# Patient Record
Sex: MALE | Race: WHITE | Employment: FULL TIME | URBAN - METROPOLITAN AREA
[De-identification: names, ages, dates, MRNs, and addresses within clinical notes are randomized per-mention and may not be internally consistent; named-entity substitution may affect disease eponyms.]

---

## 2017-09-25 ENCOUNTER — ANESTHESIA EVENT (OUTPATIENT)
Dept: SURGERY | Age: 52
End: 2017-09-25
Payer: COMMERCIAL

## 2017-09-25 RX ORDER — CHOLECALCIFEROL TAB 125 MCG (5000 UNIT) 125 MCG
5000 TAB ORAL DAILY
COMMUNITY

## 2017-09-26 ENCOUNTER — ANESTHESIA (OUTPATIENT)
Dept: SURGERY | Age: 52
End: 2017-09-26
Payer: COMMERCIAL

## 2017-09-26 ENCOUNTER — HOSPITAL ENCOUNTER (OUTPATIENT)
Age: 52
Setting detail: OUTPATIENT SURGERY
Discharge: HOME | End: 2017-09-26
Attending: SURGERY | Admitting: SURGERY
Payer: COMMERCIAL

## 2017-09-26 VITALS
HEART RATE: 70 BPM | TEMPERATURE: 97.8 F | RESPIRATION RATE: 17 BRPM | OXYGEN SATURATION: 99 % | WEIGHT: 210.1 LBS | BODY MASS INDEX: 28.46 KG/M2 | HEIGHT: 72 IN | SYSTOLIC BLOOD PRESSURE: 141 MMHG | DIASTOLIC BLOOD PRESSURE: 84 MMHG

## 2017-09-26 PROCEDURE — 74011000250 HC RX REV CODE- 250: Performed by: SURGERY

## 2017-09-26 PROCEDURE — 74011250637 HC RX REV CODE- 250/637: Performed by: SURGERY

## 2017-09-26 PROCEDURE — 77030031139 HC SUT VCRL2 J&J -A: Performed by: SURGERY

## 2017-09-26 PROCEDURE — 74011250636 HC RX REV CODE- 250/636: Performed by: SURGERY

## 2017-09-26 PROCEDURE — 74011250636 HC RX REV CODE- 250/636

## 2017-09-26 PROCEDURE — 76060000032 HC ANESTHESIA 0.5 TO 1 HR: Performed by: SURGERY

## 2017-09-26 PROCEDURE — 77030032490 HC SLV COMPR SCD KNE COVD -B: Performed by: SURGERY

## 2017-09-26 PROCEDURE — 76010000138 HC OR TIME 0.5 TO 1 HR: Performed by: SURGERY

## 2017-09-26 PROCEDURE — 88304 TISSUE EXAM BY PATHOLOGIST: CPT | Performed by: SURGERY

## 2017-09-26 PROCEDURE — 76210000021 HC REC RM PH II 0.5 TO 1 HR: Performed by: SURGERY

## 2017-09-26 PROCEDURE — 77030011640 HC PAD GRND REM COVD -A: Performed by: SURGERY

## 2017-09-26 PROCEDURE — 74011250636 HC RX REV CODE- 250/636: Performed by: ANESTHESIOLOGY

## 2017-09-26 PROCEDURE — 77030002933 HC SUT MCRYL J&J -A: Performed by: SURGERY

## 2017-09-26 PROCEDURE — 77030018836 HC SOL IRR NACL ICUM -A: Performed by: SURGERY

## 2017-09-26 PROCEDURE — 77030020782 HC GWN BAIR PAWS FLX 3M -B

## 2017-09-26 PROCEDURE — 77030010507 HC ADH SKN DERMBND J&J -B: Performed by: SURGERY

## 2017-09-26 RX ORDER — LIDOCAINE HYDROCHLORIDE 10 MG/ML
0.1 INJECTION, SOLUTION EPIDURAL; INFILTRATION; INTRACAUDAL; PERINEURAL AS NEEDED
Status: DISCONTINUED | OUTPATIENT
Start: 2017-09-26 | End: 2017-09-26 | Stop reason: HOSPADM

## 2017-09-26 RX ORDER — DIPHENHYDRAMINE HYDROCHLORIDE 50 MG/ML
12.5 INJECTION, SOLUTION INTRAMUSCULAR; INTRAVENOUS AS NEEDED
Status: DISCONTINUED | OUTPATIENT
Start: 2017-09-26 | End: 2017-09-26 | Stop reason: HOSPADM

## 2017-09-26 RX ORDER — CEFAZOLIN SODIUM IN 0.9 % NACL 2 G/50 ML
2 INTRAVENOUS SOLUTION, PIGGYBACK (ML) INTRAVENOUS ONCE
Status: COMPLETED | OUTPATIENT
Start: 2017-09-26 | End: 2017-09-26

## 2017-09-26 RX ORDER — MIDAZOLAM HYDROCHLORIDE 1 MG/ML
2 INJECTION, SOLUTION INTRAMUSCULAR; INTRAVENOUS
Status: DISCONTINUED | OUTPATIENT
Start: 2017-09-26 | End: 2017-09-26 | Stop reason: HOSPADM

## 2017-09-26 RX ORDER — SODIUM CHLORIDE, SODIUM LACTATE, POTASSIUM CHLORIDE, CALCIUM CHLORIDE 600; 310; 30; 20 MG/100ML; MG/100ML; MG/100ML; MG/100ML
125 INJECTION, SOLUTION INTRAVENOUS CONTINUOUS
Status: DISCONTINUED | OUTPATIENT
Start: 2017-09-26 | End: 2017-09-26 | Stop reason: HOSPADM

## 2017-09-26 RX ORDER — PROPOFOL 10 MG/ML
INJECTION, EMULSION INTRAVENOUS AS NEEDED
Status: DISCONTINUED | OUTPATIENT
Start: 2017-09-26 | End: 2017-09-26 | Stop reason: HOSPADM

## 2017-09-26 RX ORDER — HYDROCODONE BITARTRATE AND ACETAMINOPHEN 5; 325 MG/1; MG/1
1 TABLET ORAL
Qty: 15 TAB | Refills: 0 | Status: SHIPPED | OUTPATIENT
Start: 2017-09-26

## 2017-09-26 RX ORDER — FLUMAZENIL 0.1 MG/ML
0.2 INJECTION INTRAVENOUS
Status: DISCONTINUED | OUTPATIENT
Start: 2017-09-26 | End: 2017-09-26 | Stop reason: HOSPADM

## 2017-09-26 RX ORDER — NALOXONE HYDROCHLORIDE 0.4 MG/ML
0.2 INJECTION, SOLUTION INTRAMUSCULAR; INTRAVENOUS; SUBCUTANEOUS
Status: DISCONTINUED | OUTPATIENT
Start: 2017-09-26 | End: 2017-09-26 | Stop reason: HOSPADM

## 2017-09-26 RX ORDER — MIDAZOLAM HYDROCHLORIDE 1 MG/ML
INJECTION, SOLUTION INTRAMUSCULAR; INTRAVENOUS AS NEEDED
Status: DISCONTINUED | OUTPATIENT
Start: 2017-09-26 | End: 2017-09-26 | Stop reason: HOSPADM

## 2017-09-26 RX ORDER — HYDROMORPHONE HYDROCHLORIDE 1 MG/ML
.25-1 INJECTION, SOLUTION INTRAMUSCULAR; INTRAVENOUS; SUBCUTANEOUS
Status: DISCONTINUED | OUTPATIENT
Start: 2017-09-26 | End: 2017-09-26 | Stop reason: HOSPADM

## 2017-09-26 RX ORDER — PROPOFOL 10 MG/ML
INJECTION, EMULSION INTRAVENOUS
Status: DISCONTINUED | OUTPATIENT
Start: 2017-09-26 | End: 2017-09-26 | Stop reason: HOSPADM

## 2017-09-26 RX ORDER — HYDROCODONE BITARTRATE AND ACETAMINOPHEN 5; 325 MG/1; MG/1
2 TABLET ORAL
Status: COMPLETED | OUTPATIENT
Start: 2017-09-26 | End: 2017-09-26

## 2017-09-26 RX ADMIN — CEFAZOLIN 2 G: 1 INJECTION, POWDER, FOR SOLUTION INTRAMUSCULAR; INTRAVENOUS; PARENTERAL at 07:24

## 2017-09-26 RX ADMIN — HYDROCODONE BITARTRATE AND ACETAMINOPHEN 2 TABLET: 5; 325 TABLET ORAL at 08:51

## 2017-09-26 RX ADMIN — MIDAZOLAM HYDROCHLORIDE 2 MG: 1 INJECTION, SOLUTION INTRAMUSCULAR; INTRAVENOUS at 07:29

## 2017-09-26 RX ADMIN — SODIUM CHLORIDE, SODIUM LACTATE, POTASSIUM CHLORIDE, AND CALCIUM CHLORIDE 125 ML/HR: 600; 310; 30; 20 INJECTION, SOLUTION INTRAVENOUS at 06:29

## 2017-09-26 RX ADMIN — PROPOFOL 20 MG: 10 INJECTION, EMULSION INTRAVENOUS at 07:41

## 2017-09-26 RX ADMIN — PROPOFOL 20 MG: 10 INJECTION, EMULSION INTRAVENOUS at 07:38

## 2017-09-26 RX ADMIN — PROPOFOL 100 MCG/KG/MIN: 10 INJECTION, EMULSION INTRAVENOUS at 07:32

## 2017-09-26 RX ADMIN — MIDAZOLAM HYDROCHLORIDE 3 MG: 1 INJECTION, SOLUTION INTRAMUSCULAR; INTRAVENOUS at 07:24

## 2017-09-26 NOTE — IP AVS SNAPSHOT
[place] [address] 
[phone] Patient: Dc Paniagua MRN: [id number] :1965 Current Discharge Medication List  
  
START taking these medications Dose & Instructions Dispensing Information Comments Morning Noon Evening Bedtime HYDROcodone-acetaminophen 5-325 mg per tablet Commonly known as:  Valente Ziegler Your last dose was: Your next dose is:    
   
   
 Dose:  1 Tab Take 1 Tab by mouth every four (4) hours as needed for Pain. Max Daily Amount: 6 Tabs. Quantity:  15 Tab Refills:  0 ASK your doctor about these medications Dose & Instructions Dispensing Information Comments Morning Noon Evening Bedtime  
 cholecalciferol (VITAMIN D3) 5,000 unit Tab tablet Commonly known as:  VITAMIN D3 Your last dose was: Your next dose is:    
   
   
 Dose:  5000 Units Take 5,000 Units by mouth daily. Refills:  0 Where to Get Your Medications Information on where to get these meds will be given to you by the nurse or doctor. ! Ask your nurse or doctor about these medications HYDROcodone-acetaminophen 5-325 mg per tablet

## 2017-09-26 NOTE — OP NOTES
Patient: Carson Alcazar MRN: [id number]  SSN: [id number]    YOB: 1965  Age: 47 y.o. Sex: male        OPERATIVE REPORT -     PREOPERATIVE DIAGNOSIS: CYST RIGHT SHOULDER    POSTOPERATIVE DIAGNOSIS: CYST RIGHT SHOULDER    OPERATIVE PROCEDURE:  Excision of cyst right shoulder    SURGEON: Fidencio Hamm MD    ANESTHESIA: mac    ANESTHESIOLOGIST: Dr Chelsey Collins: None    ESTIMATED BLOOD LOSS: Minimal.    INDICATION: Documented in the history and physical.    FINDINGS: there was an inflamed cyst in the right shoulder     PROCEDURE:     Patient was evaluated in the preop holding area and updates to history and physical was completed. The site of surgery was confirmed with the patient and the site was marked with indelible ink. Patient was then brought to the operating room and under intravenous sedation and with the patient in semi lateral position, with the pressure points protected, the appropriate area was cleaned and prepped in the standard manner. A time out was completed confirming patient, , procedure, site of surgery, special instruments and equipment needed for the procedure. Local anesthesia was infiltrated using a combination of lidocaine, marcaine . Once analgesia had been confirmed, an elliptical 5.4 cm  incision was made from the lateral margins of the are of residual inflammation. Dissection was continued carefully using sharp knife and electro cautery dissection. The mass was grasped with an Allis clamp, and using gentle traction and careful dissection, the mass was excised down to and including part of the deep fascia. The incision was then closed with 2/0 Vicryl to the deep fascia, applied in an interrupted fashion. The superficial fascia was approximated with 3/0 Monocryl and the skin was closed with subcuticular 4/0 Monocryl with dermabond. A pressure dressing was applied.        SPECIMEN: excised cyst    COUNTS: Correct at the completion of surgery.     Edwige Cr MD

## 2017-09-26 NOTE — ANESTHESIA POSTPROCEDURE EVALUATION
Post-Anesthesia Evaluation and Assessment    Patient: Marcos Dozier MRN: [id number]  SSN: [id number]    YOB: 1965  Age: 47 y.o. Sex: male       Cardiovascular Function/Vital Signs  Visit Vitals    /84 (BP 1 Location: Left arm, BP Patient Position: At rest)    Pulse 70    Temp 36.6 °C (97.8 °F)    Resp 17    Ht 6' (1.829 m)    Wt 95.3 kg (210 lb 1.6 oz)    SpO2 99%    BMI 28.49 kg/m2       Patient is status post MAC anesthesia for Procedure(s):  EXCISION CYST RIGHT SHOULDER . Nausea/Vomiting: None    Postoperative hydration reviewed and adequate. Pain:  Pain Scale 1: Numeric (0 - 10) (09/26/17 0845)  Pain Intensity 1: 3 (09/26/17 0845)   Managed    Neurological Status:   Neuro (WDL): Within Defined Limits (09/26/17 0845)  Neuro  LUE Motor Response: Purposeful (09/26/17 0845)  LLE Motor Response: Purposeful (09/26/17 0845)  RUE Motor Response: Purposeful (09/26/17 0845)  RLE Motor Response: Purposeful (09/26/17 0845)   At baseline    Mental Status and Level of Consciousness: Arousable    Pulmonary Status:   O2 Device: Room air (09/26/17 0845)   Adequate oxygenation and airway patent    Complications related to anesthesia: None    Post-anesthesia assessment completed.  No concerns    Signed By: Kelsea Pruitt MD     September 26, 2017

## 2017-09-26 NOTE — ANESTHESIA PREPROCEDURE EVALUATION
Anesthetic History   No history of anesthetic complications            Review of Systems / Medical History  Patient summary reviewed    Pulmonary  Within defined limits                 Neuro/Psych   Within defined limits           Cardiovascular  Within defined limits                Exercise tolerance: >4 METS     GI/Hepatic/Renal  Within defined limits              Endo/Other  Within defined limits           Other Findings              Physical Exam    Airway  Mallampati: II  TM Distance: 4 - 6 cm  Neck ROM: normal range of motion   Mouth opening: Normal     Cardiovascular    Rhythm: regular  Rate: normal         Dental  No notable dental hx       Pulmonary  Breath sounds clear to auscultation               Abdominal         Other Findings            Anesthetic Plan    ASA: 1  Anesthesia type: MAC            Anesthetic plan and risks discussed with: Patient

## 2017-09-26 NOTE — BRIEF OP NOTE
BRIEF OPERATIVE NOTE    Date of Procedure: 9/26/2017   Preoperative Diagnosis: CYST RIGHT SHOULDER  Postoperative Diagnosis: CYST RIGHT SHOULDER    Procedure(s):  EXCISION CYST RIGHT SHOULDER   Surgeon(s) and Role:      * Bacilio Porras MD - Primary         Assistant Staff:       Surgical Staff:  Circ-1: Jordyn Parikh RN  Scrub Tech-1: Ortiz Gilliland  Surg Asst-1: Lera Cabot, RN  Event Time In   Incision Start 1138   Incision Close      Anesthesia: MAC   Estimated Blood Loss: minimal  Specimens:   ID Type Source Tests Collected by Time Destination   1 : cyst right shoulder Preservative Shoulder  Bacilio Porras MD 9/26/2017 3553 Pathology      Findings: recently inflamed cyst   Complications: none  Implants: * No implants in log *

## 2017-09-26 NOTE — IP AVS SNAPSHOT
[address] 
[phone] Patient: Sasha Breen MRN: [id number] :1965 You are allergic to the following No active allergies Recent Documentation Height Weight BMI Smoking Status 1.829 m 95.3 kg 28.49 kg/m2 Never Smoker Emergency Contacts Name Discharge Info Relation Home Work Mobile Rima Pablo DISCHARGE CAREGIVER [3] Spouse [3]   [phone] About your hospitalization You were admitted on:  2017 You last received care in the:  [facility] PACU You were discharged on:  2017 Unit phone number:  [phone] Why you were hospitalized Your primary diagnosis was:  Not on File Providers Seen During Your Hospitalizations Provider Role Specialty Primary office phone Sindy Morel MD Attending Provider General Surgery [phone] Your Primary Care Physician (PCP) Primary Care Physician Office Phone Office Fax Axel Kessler [phone] Follow-up Information Follow up With Details Comments Contact Info Ruddy Ma DO   [address] [facility] 
[phone] Current Discharge Medication List  
  
START taking these medications Dose & Instructions Dispensing Information Comments Morning Noon Evening Bedtime HYDROcodone-acetaminophen 5-325 mg per tablet Commonly known as:  Denis Romano Your last dose was: Your next dose is:    
   
   
 Dose:  1 Tab Take 1 Tab by mouth every four (4) hours as needed for Pain. Max Daily Amount: 6 Tabs. Quantity:  15 Tab Refills:  0 ASK your doctor about these medications Dose & Instructions Dispensing Information Comments Morning Noon Evening Bedtime  
 cholecalciferol (VITAMIN D3) 5,000 unit Tab tablet Commonly known as:  VITAMIN D3 Your last dose was: Your next dose is:    
   
   
 Dose:  5000 Units Take 5,000 Units by mouth daily. Refills:  0 Where to Get Your Medications Information on where to get these meds will be given to you by the nurse or doctor. ! Ask your nurse or doctor about these medications HYDROcodone-acetaminophen 5-325 mg per tablet Discharge Instructions Patient Discharge Instructions Marycarmen Cancino / [id number] : 1965 Admitted 2017 Discharged: 2017 Take Home Medications · It is important that you take the medication exactly as they are prescribed. · Keep your medication in the bottles provided by the pharmacist and keep a list of the medication names, dosages, and times to be taken in your wallet. · Do not take other medications without consulting your doctor. What to do at [facility] Recommended diet: Regular Diet Recommended activity: Activity as tolerated If you experience any of the following symptoms severe pain or bleeding, please follow up with Bere Mcduffie MD 
. Follow-up with Bere Mcduffie MD 
 in 1 week Information obtained by : 
I understand that if any problems occur once I am at home I am to contact my physician. I understand and acknowledge receipt of the instructions indicated above. Physician's or R.N.'s Signature                                                                  Date/Time Patient or Representative Signature                                                          Date/Time DISCHARGE SUMMARY from your Nurse The following personal items collected during your admission are returned to you:  
Dental Appliance: Dental Appliances: None Vision: Visual Aid: Glasses Hearing Aid:   
Jewelry: Jewelry: None Clothing: Clothing: Other (comment) (clothes to locker) Other Valuables: Other Valuables: None Valuables sent to safe: Personal Items Sent to Safe: cell phone PATIENT INSTRUCTIONS: 
 
After general anesthesia or intravenous sedation, for 24 hours or while taking prescription Narcotics: · Limit your activities · Do not drive and operate hazardous machinery · Do not make important personal or business decisions · Do  not drink alcoholic beverages · If you have not urinated within 8 hours after discharge, please contact your surgeon on call. Report the following to your surgeon: 
· Excessive pain, swelling, redness or odor of or around the surgical area · Temperature over 100.5 · Nausea and vomiting lasting longer than 4 hours or if unable to take medications · Any signs of decreased circulation or nerve impairment to extremity: change in color, persistent  numbness, tingling, coldness or increase pain · Any questions [address] Breathing deep and coughing are very important exercises to do after surgery. Deep breathing and coughing open the little air tubes and air sacks in your lungs. You take deep breaths every day. You may not even notice - it is just something you do when you sigh or yawn. It is a natural exercise you do to keep these air passages open. After surgery, take deep breaths and cough, on purpose. Coughing and deep breathing help prevent bronchitis and pneumonia after surgery. If you had chest or belly surgery, use a pillow as a \"hug buddy\" and hold it tightly to your chest or belly when you cough. DIRECTIONS: 
6. Take 10 to 15 slow deep breaths every hour while awake. 7. Breathe in deeply, and hold it for 2 seconds. 8. Exhale slowly through puckered lips, like blowing up a balloon. 9. After every 4th or 5th deep breath, hug your pillow to your chest or belly and give a hard, deep cough. Yes, it will probably hurt. But doing this exercise is very important part of healing after surgery. Take your pain medicine to help you do this exercise without too much pain. IF YOU HAVE BEEN DIAGNOSED WITH SLEEP APNEA, PLEASE USE YOUR SLEEP APNEA DEVICE OR CPAP MACHINE WHEN YOU INTEND TO NAP AFTER TAKING PAIN MEDICATION. Ankle Pumps Ankle pumps increase the circulation of oxygenated blood to your lower extremities and decrease your risk for circulation problems such as blood clots. They also stretch the muscles, tendons and ligaments in your foot and ankle, and prevent joint contracture in the ankle and foot, especially after surgeries on the legs. It is important to do ankle pump exercises regularly after surgery because immobility increases your risk for developing a blood clot. Your doctor may also have you take an Aspirin for the next few days as well. If your doctor did not ask you to take an Aspirin, consult with him before starting Aspirin therapy on your own. Slowly point your foot forward, feeling the muscles on the top of your lower leg stretch, and hold this position for 5 seconds. Next, pull your foot back toward you as far as possible, stretching the calf muscles, and hold that position for 5 seconds. Repeat with the other foot. Perform 10 repetitions every hour while awake for both ankles if possible (down and then up with the foot once is one repetition). You should feel gentle stretching of the muscles in your lower leg when doing this exercise. If you feel pain, or your range of motion is limited, don't  Push too hard. Only go the limit your joint and muscles will let you go.   If you have increasing pain, progressively worsening leg warmth or swelling, STOP the exercise and call your doctor. Below is information about the medications your doctor is prescribing after your visit: 
 
 
 
 
Discharge Orders None Introducing [facility]! Tavia Christiansen introduces [employer] patient portal. Now you can access parts of your medical record, email your doctor's office, and request medication refills online. 1. In your internet browser, go to [email or web address] 2. Click on the First Time User? Click Here link in the Sign In box. You will see the New Member Sign Up page. 3. Enter your [employer] Access Code exactly as it appears below. You will not need to use this code after youve completed the sign-up process. If you do not sign up before the expiration date, you must request a new code. · [employer] Access Code: [email or web address] Expires: 12/25/2017  5:29 AM 
 
4. Enter the last four digits of your Social Security Number (xxxx) and Date of Birth (mm/dd/yyyy) as indicated and click Submit. You will be taken to the next sign-up page. 5. Create a [employer] ID. This will be your [employer] login ID and cannot be changed, so think of one that is secure and easy to remember. 6. Create a [employer] password. You can change your password at any time. 7. Enter your Password Reset Question and Answer. This can be used at a later time if you forget your password. 8. Enter your e-mail address. You will receive e-mail notification when new information is available in [address] 9. Click Sign Up. You can now view and download portions of your medical record. 10. Click the Download Summary menu link to download a portable copy of your medical information. If you have questions, please visit the Frequently Asked Questions section of the [employer] website. Remember, [employer] is NOT to be used for urgent needs. For medical emergencies, dial 911. Now available from your iPhone and Android! General Information Please provide this summary of care documentation to your next provider. Patient Signature:  ____________________________________________________________ Date:  ____________________________________________________________  
  
Millard Livings Provider Signature:  ____________________________________________________________ Date:  ____________________________________________________________

## 2017-09-26 NOTE — DISCHARGE INSTRUCTIONS
Patient Discharge Instructions    Ruben Patterson / [id number] : 1965    Admitted 2017 Discharged: 2017     Take Home Medications            · It is important that you take the medication exactly as they are prescribed. · Keep your medication in the bottles provided by the pharmacist and keep a list of the medication names, dosages, and times to be taken in your wallet. · Do not take other medications without consulting your doctor. What to do at Home    Recommended diet: Regular Diet    Recommended activity: Activity as tolerated    If you experience any of the following symptoms severe pain or bleeding, please follow up with Keara Cardoso MD  .    Follow-up with Keara Cardoso MD   in 1 week        Information obtained by :  I understand that if any problems occur once I am at home I am to contact my physician. I understand and acknowledge receipt of the instructions indicated above. Physician's or R.N.'s Signature                                                                  Date/Time                                                                                                                                              Patient or Representative Signature                                                          Date/Time    DISCHARGE SUMMARY from your Nurse    The following personal items collected during your admission are returned to you:   Dental Appliance: Dental Appliances: None  Vision: Visual Aid: Glasses  Hearing Aid:    Jewelry: Jewelry: None  Clothing: Clothing: Other (comment) (clothes to locker)  Other Valuables:  Other Valuables: None  Valuables sent to safe: Personal Items Sent to Safe: cell phone    PATIENT INSTRUCTIONS:    After general anesthesia or intravenous sedation, for 24 hours or while taking prescription Narcotics:  · Limit your activities  · Do not drive and operate hazardous machinery  · Do not make important personal or business decisions  · Do  not drink alcoholic beverages  · If you have not urinated within 8 hours after discharge, please contact your surgeon on call. Report the following to your surgeon:  · Excessive pain, swelling, redness or odor of or around the surgical area  · Temperature over 100.5  · Nausea and vomiting lasting longer than 4 hours or if unable to take medications  · Any signs of decreased circulation or nerve impairment to extremity: change in color, persistent  numbness, tingling, coldness or increase pain  · Any questions    COUGH AND DEEP BREATHE    Breathing deep and coughing are very important exercises to do after surgery. Deep breathing and coughing open the little air tubes and air sacks in your lungs. You take deep breaths every day. You may not even notice - it is just something you do when you sigh or yawn. It is a natural exercise you do to keep these air passages open. After surgery, take deep breaths and cough, on purpose. Coughing and deep breathing help prevent bronchitis and pneumonia after surgery. If you had chest or belly surgery, use a pillow as a \"hug buddy\" and hold it tightly to your chest or belly when you cough. DIRECTIONS:  6. Take 10 to 15 slow deep breaths every hour while awake. 7. Breathe in deeply, and hold it for 2 seconds. 8. Exhale slowly through puckered lips, like blowing up a balloon. 9. After every 4th or 5th deep breath, hug your pillow to your chest or belly and give a hard, deep cough. Yes, it will probably hurt. But doing this exercise is very important part of healing after surgery. Take your pain medicine to help you do this exercise without too much pain.     IF YOU HAVE BEEN DIAGNOSED WITH SLEEP APNEA, PLEASE USE YOUR SLEEP APNEA DEVICE OR CPAP MACHINE WHEN YOU INTEND TO NAP AFTER TAKING PAIN MEDICATION. Ankle Pumps    Ankle pumps increase the circulation of oxygenated blood to your lower extremities and decrease your risk for circulation problems such as blood clots. They also stretch the muscles, tendons and ligaments in your foot and ankle, and prevent joint contracture in the ankle and foot, especially after surgeries on the legs. It is important to do ankle pump exercises regularly after surgery because immobility increases your risk for developing a blood clot. Your doctor may also have you take an Aspirin for the next few days as well. If your doctor did not ask you to take an Aspirin, consult with him before starting Aspirin therapy on your own. Slowly point your foot forward, feeling the muscles on the top of your lower leg stretch, and hold this position for 5 seconds. Next, pull your foot back toward you as far as possible, stretching the calf muscles, and hold that position for 5 seconds. Repeat with the other foot. Perform 10 repetitions every hour while awake for both ankles if possible (down and then up with the foot once is one repetition). You should feel gentle stretching of the muscles in your lower leg when doing this exercise. If you feel pain, or your range of motion is limited, don't  Push too hard. Only go the limit your joint and muscles will let you go. If you have increasing pain, progressively worsening leg warmth or swelling, STOP the exercise and call your doctor. Below is information about the medications your doctor is prescribing after your visit:    Other information in your discharge envelope:  []     PRESCRIPTIONS  []     PHYSICAL THERAPY PRESCRIPTION  []     APPOINTMENT CARDS  []     [facility]ional Anesthesia Pamphlet for block or block with On-Q Catheter from Anesthesia Service  []     Medical device information sheets/pamphlets from their    []     School/work excuse note.   []     /parent work excuse note. These are general instructions for a healthy lifestyle:    *  Please give a list of your current medications to your Primary Care Provider. *  Please update this list whenever your medications are discontinued, doses are      changed, or new medications (including over-the-counter products) are added. *  Please carry medication information at all times in case of emergency situations. About Smoking  No smoking / No tobacco products / Avoid exposure to second hand smoke    Surgeon General's Warning:  Quitting smoking now greatly reduces serious risk to your health. Obesity, smoking, and sedentary lifestyle greatly increases your risk for illness and disease. A healthy diet, regular physical exercise & weight monitoring are important for maintaining a healthy lifestyle. Congestive Heart Failure  You may be retaining fluid if you have a history of heart failure or if you experience any of the following symptoms:  Weight gain of 3 pounds or more overnight or 5 pounds in a week, increased swelling in our hands or feet or shortness of breath while lying flat in bed. Please call your doctor as soon as you notice any of these symptoms; do not wait until your next office visit. Recognize signs and symptoms of STROKE:  F - face looks uneven  A - arms unable to move or move even  S - speech slurred or non-existent  T - time-call 911 as soon as signs and symptoms begin-DO NOT go         Back to bed or wait to see if you get better-TIME IS BRAIN. Warning signs of HEART ATTACK  Call 911 if you have these symptoms    · Chest discomfort. Most heart attacks involve discomfort in the center of the chest that lasts more than a few minutes, or that goes away and comes back. It can feel like uncomfortable pressure, squeezing, fullness, or pain. · Discomfort in other areas of the upper body.        Symptoms can include pain or discomfort in one or both        Arms, the back, neck, jaw, or stomach. ·  Shortness of breath with or without chest discomfort. · Other signs may include breaking out in a cold sweat, nausea, or lightheadedness    Don't wait more than five minutes to call 911 - MINUTES MATTER! Fast action can save your life. Calling 911 is almost always the fastest way to get lifesaving treatment. Emergency Medical Services staff can begin treatment when they arrive - up to an hour sooner than if someone gets to the hospital by car. [facility] MEDICATION AND SIDE EFFECT GUIDE    The [facility] MEDICATION AND SIDE EFFECT GUIDE was provided to the PATIENT AND CARE PROVIDER.   Information provided includes instruction about drug purpose and common side effects for the following medications:    · Norco

## (undated) DEVICE — DEVICE TRNSF SPIK STL 2008S] MICROTEK MEDICAL INC]

## (undated) DEVICE — STERILE POLYISOPRENE POWDER-FREE SURGICAL GLOVES: Brand: PROTEXIS

## (undated) DEVICE — TOWEL,OR,DSP,ST,BLUE,STD,2/PK,40PK/CS: Brand: MEDLINE

## (undated) DEVICE — REM POLYHESIVE ADULT PATIENT RETURN ELECTRODE: Brand: VALLEYLAB

## (undated) DEVICE — SURGICAL PROCEDURE PACK BASIN MAJ SET CUST NO CAUT

## (undated) DEVICE — DERMABOND SKIN ADH 0.7ML -- DERMABOND ADVANCED 12/BX

## (undated) DEVICE — SUTURE MCRYL SZ 4-0 L27IN ABSRB UD L19MM PS-2 1/2 CIR PRIM Y426H

## (undated) DEVICE — SUTURE ABSORBABLE BRAIDED 2-0 CT-1 27 IN UD VICRYL J259H

## (undated) DEVICE — (D)PREP SKN CHLRAPRP APPL 26ML -- CONVERT TO ITEM 371833

## (undated) DEVICE — DRAPE,EXTREMITY,89X128,STERILE: Brand: MEDLINE

## (undated) DEVICE — SOL IRRIGATION INJ NACL 0.9% 500ML BTL

## (undated) DEVICE — INFECTION CONTROL KIT SYS

## (undated) DEVICE — DRAPE,REIN 53X77,STERILE: Brand: MEDLINE

## (undated) DEVICE — NEEDLE HYPO 22GA L1.5IN BLK S STL HUB POLYPR SHLD REG BVL

## (undated) DEVICE — (D)SYR 10ML 1/5ML GRAD NSAF -- PKGING CHANGE USE ITEM 338027

## (undated) DEVICE — DEVON™ KNEE AND BODY STRAP 60" X 3" (1.5 M X 7.6 CM): Brand: DEVON

## (undated) DEVICE — DRAPE,LAPAROTOMY,PED,STERILE: Brand: MEDLINE

## (undated) DEVICE — ROCKER SWITCH PENCIL BLADE ELECTRODE, HOLSTER: Brand: EDGE

## (undated) DEVICE — SUTURE MCRYL SZ 3-0 L27IN ABSRB UD L26MM SH 1/2 CIR Y416H

## (undated) DEVICE — KENDALL SCD EXPRESS SLEEVES, KNEE LENGTH, MEDIUM: Brand: KENDALL SCD

## (undated) DEVICE — COVER LT HNDL BLU PLAS